# Patient Record
Sex: MALE | Race: BLACK OR AFRICAN AMERICAN
[De-identification: names, ages, dates, MRNs, and addresses within clinical notes are randomized per-mention and may not be internally consistent; named-entity substitution may affect disease eponyms.]

---

## 2021-02-26 ENCOUNTER — TRANSCRIBE ORDERS (OUTPATIENT)
Dept: OTHER | Age: 46
End: 2021-02-26

## 2021-02-26 DIAGNOSIS — C80.1 VERRUCOUS CARCINOMA (H): Primary | ICD-10-CM

## 2021-03-04 NOTE — TELEPHONE ENCOUNTER
Action    Action Taken 3/4/21:    -Imaging previously resolved from [facility] (detailed below) pt has upcoming imaging scheduled:   03/05/2021 Hospital Encounter RADIOLOGY     -Spoke w/ pt - pt advised he could not recall if he had any upcoming imaging scheduled. Pt advised tx thru [facility].    -Spoke w/ [facility] RAD - they stated they could not confirm any pt info over the phone.     -Will leave in Deferred Tab for a few days to see if any new imaging.    -[employer] Tracking/[facility] - Path: [id number]    -3/8/21:    -Updated imaging requested from [facility] (detailed below)  9:36 AM    Imaging resolved to PACS  10:59 AM    3/9/31:    -Slides from [facility] received  2:22 PM       RECORDS STATUS - ALL OTHER DIAGNOSIS      RECORDS RECEIVED FROM: [facility]   DATE RECEIVED:    NOTES STATUS DETAILS   OFFICE NOTE from referring provider [facility] Dr. Stephanie Herrera   OFFICE NOTE from medical oncologist [facility] Dr. Herrera   DISCHARGE SUMMARY from hospital [facility] 2/6/21, 7/15/20, 2/28/20, 11/10/19   DISCHARGE REPORT from the ER [facility] 11/24/20, 4/12/20, 2/10/20 2/7/20   OPERATIVE REPORT [facility] 2/8/21, 12/18/20, 8/8/19: Exam under anesthesia    7/10/20: Colonoscopy    3/4/20: TURP  2/29/20: Cystoscopy  10/11/19: Excision & Fulguration     MEDICATION LIST [facility]   CLINICAL TRIAL TREATMENTS TO DATE     LABS     PATHOLOGY REPORTS [facility], Report in CE, Slides requested 3/4 12/18/20: [id number]  7/10/20: [id number]  10/11/19: [id number]  8/9/19: [id number]   ANYTHING RELATED TO DIAGNOSIS Epic/CE 2/26/21   GENONOMIC TESTING     TYPE:     IMAGING (NEED IMAGES & REPORT)     CT SCANS PACS 3/5/21, 2/8/21, 2/7/21, 11/24/20: [facility]   MRI PACS 12/16/20: [facility]   MAMMO     ULTRASOUND     PET

## 2021-03-04 NOTE — TELEPHONE ENCOUNTER
ONCOLOGY INTAKE: Records Information      APPT INFORMATION:  Referring provider:  Stephanie Herrera MD  Referring provider s clinic:  [facility]  Reason for visit/diagnosis: Verrucous carcinoma  Has patient been notified of appointment date and time?: Yes    RECORDS INFORMATION:  Were the records received with the referral (via [employer])? No    Has patient been seen for any external appt for this diagnosis? Yes    If yes, where? [facility]    Has patient had any imaging or procedures outside of [place] for this condition? Yes      If Yes, where? [facility]    ADDITIONAL INFORMATION:  None

## 2021-03-10 PROCEDURE — 999N001032 HC STATISTIC REVIEW OUTSIDE SLIDES TC 88321: Performed by: INTERNAL MEDICINE

## 2021-03-10 PROCEDURE — 88321 CONSLTJ&REPRT SLD PREP ELSWR: CPT | Performed by: PATHOLOGY

## 2021-03-11 ENCOUNTER — PRE VISIT (OUTPATIENT)
Dept: ONCOLOGY | Facility: CLINIC | Age: 46
End: 2021-03-11

## 2021-03-11 ENCOUNTER — VIRTUAL VISIT (OUTPATIENT)
Dept: ONCOLOGY | Facility: CLINIC | Age: 46
End: 2021-03-11
Attending: INTERNAL MEDICINE
Payer: COMMERCIAL

## 2021-03-11 DIAGNOSIS — C21.0 ANAL CARCINOMA (H): Primary | ICD-10-CM

## 2021-03-11 PROCEDURE — 999N001193 HC VIDEO/TELEPHONE VISIT; NO CHARGE

## 2021-03-11 PROCEDURE — 99417 PROLNG OP E/M EACH 15 MIN: CPT | Mod: 95 | Performed by: INTERNAL MEDICINE

## 2021-03-11 PROCEDURE — 99205 OFFICE O/P NEW HI 60 MIN: CPT | Mod: 95 | Performed by: INTERNAL MEDICINE

## 2021-03-11 RX ORDER — TOLTERODINE 4 MG/1
CAPSULE, EXTENDED RELEASE ORAL
COMMUNITY
Start: 2020-04-06

## 2021-03-11 RX ORDER — CLOPIDOGREL BISULFATE 75 MG/1
TABLET ORAL
COMMUNITY
Start: 2020-07-23

## 2021-03-11 RX ORDER — LISINOPRIL 40 MG/1
40 TABLET ORAL
COMMUNITY
Start: 2020-12-29

## 2021-03-11 RX ORDER — AMLODIPINE BESYLATE 10 MG/1
10 TABLET ORAL
COMMUNITY
Start: 2020-10-02

## 2021-03-11 RX ORDER — TAMSULOSIN HYDROCHLORIDE 0.4 MG/1
0.4 CAPSULE ORAL
COMMUNITY
Start: 2020-04-17

## 2021-03-11 RX ORDER — NITROGLYCERIN 0.4 MG/1
TABLET SUBLINGUAL
COMMUNITY
Start: 2021-03-05

## 2021-03-11 RX ORDER — ASPIRIN 81 MG/1
TABLET, COATED ORAL
COMMUNITY
Start: 2020-06-10

## 2021-03-11 RX ORDER — ESCITALOPRAM OXALATE 10 MG/1
TABLET ORAL
COMMUNITY
Start: 2021-03-05

## 2021-03-11 RX ORDER — ATORVASTATIN CALCIUM 80 MG/1
80 TABLET, FILM COATED ORAL
COMMUNITY
Start: 2021-03-09

## 2021-03-11 RX ORDER — OXYCODONE HYDROCHLORIDE 5 MG/1
TABLET ORAL
COMMUNITY
Start: 2021-03-09

## 2021-03-11 RX ORDER — TRAZODONE HYDROCHLORIDE 50 MG/1
50 TABLET, FILM COATED ORAL
COMMUNITY
Start: 2021-03-05

## 2021-03-11 RX ORDER — CARVEDILOL 25 MG/1
25 TABLET ORAL
COMMUNITY
Start: 2020-08-31

## 2021-03-11 RX ORDER — ACETAMINOPHEN 325 MG/1
650 TABLET ORAL
COMMUNITY
Start: 2020-11-23

## 2021-03-11 NOTE — PATIENT INSTRUCTIONS
I would agree with proceeding with radiation with chemotherapy and follow-up with Dr. Herrera at [facility]

## 2021-03-11 NOTE — LETTER
"    3/11/2021         RE: Forrest Hartman  [address]  [place] [address]        Dear Colleague,    Thank you for referring your patient, Forrest Hartmna, to the [facility]. Please see a copy of my visit note below.    Forrest is a 45 year old who is being evaluated via a billable video visit.      How would you like to obtain your AVS? Mail a copy  If the video visit is dropped, the invitation should be resent by: Text to cell phone: [phone]  Will anyone else be joining your video visit? No     Vitals - Patient Reported  Systolic (Patient Reported): 120  Diastolic (Patient Reported): 78  Weight (Patient Reported): 72.6 kg (160 lb)  Height (Patient Reported): 172.7 cm (5' 8\")  BMI (Based on Pt Reported Ht/Wt): 24.33  Pain Score: No Pain (0)    Tari LINARES      Video Start Time: 3:27 PM  Video-Visit Details    Type of service:  Video Visit    Video End Time:4:03 PM    Originating Location (pt. Location): Home    Distant Location (provider location):  [facility]     Platform used for Video Visit: Chirply    Oncology initial visit:  Date on this visit: 3/11/2021    Forrest Hartman  is referred by Dr.Rachel Herrera for an oncology consultation. 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    Primary Physician: Eusebia Johnson     History Of Present Illness:  Mr. Hartman is a 45 year old male who presents with new diagnosis of verrucous carcinoma.      This is a video visit through Chirply.  I reviewed outside notes from Dr Herrera, his Oncologist and I also spoke with Dr Herrera today and discussed the case. I also reviewed notes from Surgery and Rad Oncologist and Cardiologist ( all in Care Everywhere )    He has a complicated past medical hx including HIV/AIDS, Hep B, CAD, s/p SRIRAM, CKD ( baseline Cr ~1.5), anxiety and long standing hx of extensive condyloma acuminata, pelvic abscess in 3/2020. In Feb 2021 he was " admitted to [facility] with acute on chronic rectal pain and he had perineal abscess requiring I&D and seton and wick placement.    Of note at times he has had improper follow-up.  On 2020 MRI of the pelvis showed resolution of the previous abscess seen in 2020 but there is an increased size of lobulated/frond-like heterogeneously enhancing anal condyloma, which now extends into the adjacent prostate gland and seminal vesicles, abutting the urinary bladder. A portion of the condyloma is within the space previously occupied by multifocal abscesses as well as the anterior rectal wall for length of 11 cm. Findings were concerning for malignant transformation.    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      Follow-up CT abdomen/pelvis on 2021 showed lobulated heterogeneously enhancing mass centered at the anorectal junction with invasion of the seminal vesicle and prostate as well as protuberant randy cleft component, similar to studies of 12/15/2020.  1.7 x 1.3 cm hypodense collection at the base of the penis near the corpora spongiosum, similar to MR of 12/15/2020 and presumably stable focal necrosis of the mass given stability.   3.0 x 2.2 cm peripheral enhancing collection at the anterior left paracentral perineum near the anal verge, new from prior examinations raising concerns for abscess.    CT chest on 2021 did not show evidence of metastatic disease.    There was discussion at the tumor board and it was recommended to treat him like squamous cell cancer with definite chemotherapy and radiation.  Further biopsy was not  recommended because it would not change the management and there is a good chance that even deeper biopsy would not be diagnostic and would be risky.    Most recent CT chest abdomen pelvis on 3/5/2021 showed residual abscess at the base of the penis and also directly posterior to the seminal vesicles.  There is masslike enlargement/expansion of the anal canal distally. The heterogeneous mass in that location measures 5.9 x 6.2 cm and extends from the gluteal cleft, where there is an exophytic mass along the gluteal fold and from there extends about 7 cm into the perineum, filling the distal rectum. Above this, the rectal walls are thickened for a distance of about 11 cm. There is perirectal fat stranding along the extent of the mass. Persistent enlarged lymph nodes in the left internal iliac chain and retroperitoneum, as high as the level of the inferior mesenteric artery takeoff.    He feels pain in his anal area. This pain has like he has never experienced before. He takes Oxycodone q 4hrs for the pain. He still has seton in place. He is not on Abx now. No other pain. Eating well. Bowel movements are fine but painful. Urination is fine. No nausea or vomiting. He has lost about 20 lbs over the last 6-8 weeks. No recent fevers.no dyspnea. No neuropathy. No new swellings.   He had cardiac stent placed in Nov 2019- and since then he has been stable from cardiac stand point    ECOG 0-1    ROS:  A comprehensive ROS was otherwise neg      Past Medical/Surgical History:  No past medical history on file.  No past surgical history on file.   As above      Cancer History:   As above    Allergies:  Allergies as of 03/11/2021     (No Known Allergies)     Current Medications:  Current Outpatient Medications   Medication Sig Dispense Refill     acetaminophen (TYLENOL) 325 MG tablet Take 650 mg by mouth       amLODIPine (NORVASC) 10 MG tablet Take 10 mg by mouth       ASPIRIN LOW DOSE 81 MG EC tablet        atorvastatin (LIPITOR)  80 MG tablet Take 80 mg by mouth       bictegravir-emtricitabine-tenofovir (BIKTARVY) -25 MG per tablet Take 1 tablet by mouth       carvedilol (COREG) 25 MG tablet Take 25 mg by mouth       clopidogrel (PLAVIX) 75 MG tablet        escitalopram (LEXAPRO) 10 MG tablet        Lactobacillus Rhamnosus, GG, (RA PROBIOTIC DIGESTIVE CARE) CAPS Take 1 capsule by mouth       lisinopril (ZESTRIL) 40 MG tablet Take 40 mg by mouth       nitroGLYcerin (NITROSTAT) 0.4 MG sublingual tablet        oxyCODONE (ROXICODONE) 5 MG tablet        tamsulosin (FLOMAX) 0.4 MG capsule Take 0.4 mg by mouth       tolterodine ER (DETROL LA) 4 MG 24 hr capsule        traZODone (DESYREL) 50 MG tablet Take 50 mg by mouth        Family History:  No family history on file.   Maternal grand mother had breast cancer.  He does not have any children.     Social History:  Social History     Socioeconomic History     Marital status: Single     Spouse name: Not on file     Number of children: Not on file     Years of education: Not on file     Highest education level: Not on file   Occupational History     Not on file   Social Needs     Financial resource strain: Not on file     Food insecurity     Worry: Not on file     Inability: Not on file     Transportation needs     Medical: Not on file     Non-medical: Not on file   Tobacco Use     Smoking status: Not on file   Substance and Sexual Activity     Alcohol use: Not on file     Drug use: Not on file     Sexual activity: Not on file   Lifestyle     Physical activity     Days per week: Not on file     Minutes per session: Not on file     Stress: Not on file   Relationships     Social connections     Talks on phone: Not on file     Gets together: Not on file     Attends Judaism service: Not on file     Active member of club or organization: Not on file     Attends meetings of clubs or organizations: Not on file     Relationship status: Not on file     Intimate partner violence     Fear of current or  ex partner: Not on file     Emotionally abused: Not on file     Physically abused: Not on file     Forced sexual activity: Not on file   Other Topics Concern     Not on file   Social History Narrative     Not on file     Used to smoke but quit 1 month. No etoh. Lives with sister.     Physical Exam:  There were no vitals taken for this visit.   Wt Readings from Last 4 Encounters:   No data found for Wt         Constitutional.  Does not seem to be in any acute distress.  Eyes.  No redness or discharge noted.  Respiratory.  Speaking in full sentences.  Breathing seems comfortable without any accessory use of muscles.    Skin.  Visualized his skin does not show any obvious rashes.  Musculoskeletal.  Range of motion for visualized areas is intact.  Neurological.  Alert and oriented x3.  Psychiatric.  Mood, mentation and affect are normal.  Decision making capacity is intact.      The rest of a comprehensive physical examination is deferred due to Public Health Emergency video visit restrictions.    Laboratory/Imaging Studies    Reviewed    2/26/2021.  CBC shows WBC 9.6.  Hemoglobin 9.7.  Platelets 429.  MCV 93.7.  Chemistry shows creatinine 1.55 which is a stable.  LFT shows albumin 3.2.  Rest is a stable.  2/9/2021.  HIV RNA 50 with a log of 1.7.  CD4 count 753.    Surgical Pathology Final Report  Specimen Type:  A. Skin, perianal mass, biopsy  B. Skin, anus canal mass, biopsy    Reviewed imaging and pathology and it is described above.    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    We discussed the situation in detail. I also discussed with Dr Herrera in detail today.  I agree with treating him with definitive radiation with chemotherapy ( 5FU/Mitomycin)  I do not believe  that repeating a biopsy would change my management and I do nto recommend that.  We discussed the rational schedule and potential side effects of chemotherapy in detail.    He has history of coronary artery disease and drug-eluting stent placement in November 2019.  Because 5-FU can cause coronary vasospasm, there is a plan to monitor him more closely while giving chemotherapy so it would be given inpatient.  I believe it is reasonable.  He also has chronic kidney disease but his renal functions are good enough that we can use the full dose chemotherapy and since we are going with a curative intent, at this time I would recommend treating him with standard dosage.  Potentially modifications could be done with the cycle #2 if needed.    I also would also recommend talking to infectious disease to consider putting him on prophylactic antibiotics due to the perianal abscess because chemotherapy and radiation would increase the risk of infection.    Pain from the malignancy as well as abscess.  He is on oxycodone.    HIV AIDS.  Continue antiretrovirals and close follow-up with infectious disease.    Anemia- would recommend doing anemia workup.     Going forward he [place] [facility]  He has an appointment for tomorrow.    All of his questions to his satisfaction.  He is agreeable and comfortable with the plan.    Dwain Davis MD         I spent 91 minutes on this visit including the video time, reviewing records and labs and imaging and discussing with Dr Herrera and coordination of care and documentation.            Again, thank you for allowing me to participate in the care of your patient.        Sincerely,        Dwain Davis MD

## 2021-03-15 LAB — COPATH REPORT: NORMAL
